# Patient Record
Sex: MALE | Race: WHITE | NOT HISPANIC OR LATINO | Employment: FULL TIME | URBAN - METROPOLITAN AREA
[De-identification: names, ages, dates, MRNs, and addresses within clinical notes are randomized per-mention and may not be internally consistent; named-entity substitution may affect disease eponyms.]

---

## 2017-06-01 ENCOUNTER — HOSPITAL ENCOUNTER (EMERGENCY)
Facility: CLINIC | Age: 32
Discharge: HOME | End: 2017-06-02
Attending: EMERGENCY MEDICINE | Admitting: EMERGENCY MEDICINE
Payer: COMMERCIAL

## 2017-06-01 VITALS
WEIGHT: 182 LBS | RESPIRATION RATE: 16 BRPM | BODY MASS INDEX: 25.48 KG/M2 | DIASTOLIC BLOOD PRESSURE: 74 MMHG | TEMPERATURE: 98.4 F | SYSTOLIC BLOOD PRESSURE: 125 MMHG | HEIGHT: 71 IN | OXYGEN SATURATION: 99 % | HEART RATE: 89 BPM

## 2017-06-01 DIAGNOSIS — S16.1XXA CERVICAL STRAIN, INITIAL ENCOUNTER: ICD-10-CM
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
## 2017-06-01 DIAGNOSIS — S70.01XA CONTUSION OF RIGHT HIP, INITIAL ENCOUNTER: ICD-10-CM

## 2017-06-01 PROCEDURE — 99283 EMERGENCY DEPT VISIT LOW MDM: CPT

## 2017-06-01 PROCEDURE — 25000132 ZZH RX MED GY IP 250 OP 250 PS 637: Performed by: EMERGENCY MEDICINE

## 2017-06-01 RX ORDER — HYDROCODONE BITARTRATE AND ACETAMINOPHEN 5; 325 MG/1; MG/1
1 TABLET ORAL ONCE
Status: COMPLETED | OUTPATIENT
Start: 2017-06-01 | End: 2017-06-01

## 2017-06-01 RX ADMIN — HYDROCODONE BITARTRATE AND ACETAMINOPHEN 1 TABLET: 5; 325 TABLET ORAL at 22:39

## 2017-06-01 ASSESSMENT — ENCOUNTER SYMPTOMS
NECK PAIN: 1
BACK PAIN: 1

## 2017-06-01 NOTE — LETTER
[facility] EMERGENCY DEPARTMENT  [address] [place] [address]  [phone]    Emir Brian  [address]  [facility] [address]  [phone] (home) [phone] (work)    : 1985      To Whom it may concern:    Emir Brian was seen in our Emergency Department today, 2017.  I expect his condition to improve over the next 1 days.  He may return to work/school when improved.    Sincerely,        Shannon Giles

## 2017-06-02 ENCOUNTER — APPOINTMENT (OUTPATIENT)
Dept: GENERAL RADIOLOGY | Facility: CLINIC | Age: 32
End: 2017-06-02
Attending: EMERGENCY MEDICINE
Payer: COMMERCIAL

## 2017-06-02 PROCEDURE — 72040 X-RAY EXAM NECK SPINE 2-3 VW: CPT

## 2017-06-02 RX ORDER — HYDROCODONE BITARTRATE AND ACETAMINOPHEN 5; 325 MG/1; MG/1
1 TABLET ORAL EVERY 4 HOURS PRN
Qty: 8 TABLET | Refills: 0 | Status: SHIPPED | OUTPATIENT
Start: 2017-06-02 | End: 2023-12-02

## 2017-06-02 NOTE — ED NOTES
Discharge instructions gone over with pt, verbalized understanding. Discharged home with plan for follow up and prescription for pain medications. Denies questions at time of discharge.
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
## 2018-04-23 ENCOUNTER — HOSPITAL ENCOUNTER (EMERGENCY)
Facility: CLINIC | Age: 33
Discharge: HOME | End: 2018-04-23
Attending: EMERGENCY MEDICINE | Admitting: EMERGENCY MEDICINE

## 2018-04-23 VITALS — TEMPERATURE: 98.6 F | DIASTOLIC BLOOD PRESSURE: 89 MMHG | OXYGEN SATURATION: 98 % | SYSTOLIC BLOOD PRESSURE: 153 MMHG

## 2018-04-23 DIAGNOSIS — T22.20XA PARTIAL THICKNESS BURN OF LEFT UPPER EXTREMITY, UNSPECIFIED SITE OF UPPER EXTREMITY, INITIAL ENCOUNTER: ICD-10-CM

## 2018-04-23 PROCEDURE — 99283 EMERGENCY DEPT VISIT LOW MDM: CPT

## 2018-04-23 PROCEDURE — 90471 IMMUNIZATION ADMIN: CPT

## 2018-04-23 PROCEDURE — 25000128 H RX IP 250 OP 636: Performed by: EMERGENCY MEDICINE

## 2018-04-23 PROCEDURE — 25000132 ZZH RX MED GY IP 250 OP 250 PS 637: Performed by: EMERGENCY MEDICINE

## 2018-04-23 PROCEDURE — 90715 TDAP VACCINE 7 YRS/> IM: CPT | Performed by: EMERGENCY MEDICINE

## 2018-04-23 RX ORDER — GINSENG 100 MG
CAPSULE ORAL 2 TIMES DAILY
Qty: 30 G | Refills: 0 | Status: SHIPPED | OUTPATIENT
Start: 2018-04-23 | End: 2023-12-02

## 2018-04-23 RX ORDER — GABAPENTIN 300 MG/1
300 CAPSULE ORAL 3 TIMES DAILY
Qty: 21 CAPSULE | Refills: 0 | Status: SHIPPED | OUTPATIENT
Start: 2018-04-23 | End: 2020-02-19

## 2018-04-23 RX ORDER — IBUPROFEN 800 MG/1
800 TABLET, FILM COATED ORAL EVERY 8 HOURS PRN
Qty: 21 TABLET | Refills: 0 | Status: SHIPPED | OUTPATIENT
Start: 2018-04-23 | End: 2018-04-30

## 2018-04-23 RX ORDER — GABAPENTIN 300 MG/1
300 CAPSULE ORAL ONCE
Status: COMPLETED | OUTPATIENT
Start: 2018-04-23 | End: 2018-04-23

## 2018-04-23 RX ORDER — IBUPROFEN 400 MG/1
800 TABLET, FILM COATED ORAL ONCE
Status: COMPLETED | OUTPATIENT
Start: 2018-04-23 | End: 2018-04-23

## 2018-04-23 RX ADMIN — GABAPENTIN 300 MG: 300 CAPSULE ORAL at 15:38

## 2018-04-23 RX ADMIN — IBUPROFEN 800 MG: 400 TABLET ORAL at 15:01

## 2018-04-23 RX ADMIN — CLOSTRIDIUM TETANI TOXOID ANTIGEN (FORMALDEHYDE INACTIVATED), CORYNEBACTERIUM DIPHTHERIAE TOXOID ANTIGEN (FORMALDEHYDE INACTIVATED), BORDETELLA PERTUSSIS TOXOID ANTIGEN (GLUTARALDEHYDE INACTIVATED), BORDETELLA PERTUSSIS FILAMENTOUS HEMAGGLUTININ ANTIGEN (FORMALDEHYDE INACTIVATED), BORDETELLA PERTUSSIS PERTACTIN ANTIGEN, AND BORDETELLA PERTUSSIS FIMBRIAE 2/3 ANTIGEN 0.5 ML: 5; 2; 2.5; 5; 3; 5 INJECTION, SUSPENSION INTRAMUSCULAR at 15:38

## 2018-04-23 NOTE — LETTER
April 23, 2018      To Whom It May Concern:      Emir Brian was seen in our Emergency Department today, 04/23/18.  I expect his condition to improve over the next 7 days.  He may return to work when improved.        Sincerely,        Richie Kang MD

## 2018-04-23 NOTE — DISCHARGE INSTRUCTIONS
Second-Degree Burn  A burn occurs when skin is exposed to too much heat, sun, or harsh chemicals. A second-degree burn (partial-thickness burn) is deeper than a first-degree burn (superficial burn). It usually causes a blister to form. The blister may remain intact and gradually go away on its own. Or it may break open. The goal of treatment is to relieve pain and stop infection while the burn heals.  Home care  Use pain medicine as directed. If no pain medicine was prescribed, you may use over-the-counter medicine to control pain. If you have chronic liver or kidney disease, talk with your healthcare provider before using acetaminophen or ibuprofen. Also talk with your provider if you've had a stomach ulcer or GI bleeding.  General care    On the first day, you may put a cool compress on the wound to ease pain. A cool compress is a small towel soaked in cool water.    If you were sent home with the blister intact, don't break the blister. The risk for infection is greater if the blister breaks. If a bandage was applied, change it once a day, unless told otherwise. If the bandage becomes wet or soiled, change it as soon as you can.    Sometimes an infection may occur even with proper treatment. Check the burn daily for the signs of infection listed below.    Eat more calories and protein until your wound is healed.    Wear a hat, sunscreen, and long sleeves while in the sun to protect the skin.    Don't pick or scratch at the wound. Use over-the-counter medicines like diphenhydramine for itching.    Avoid tight-fitting clothes.  To change a bandage:    Wash your hands.    Take off the old bandage. If the bandage sticks, soak it off under warm running water.    Once the bandage is off, gently wash the burn area with mild soap and warm water to remove any cream, ointment, ooze, or scab. You may do this in a sink, under a tub faucet, or in the shower. Rinse off the soap and gently pat dry with a clean towel.    Check  for signs of infection listed below.    Put any prescribed antibiotic cream or ointment on the wound.    Cover the burn with nonstick gauze. Then wrap it with the bandage material.  Follow-up care  Follow up with your healthcare provider, or as advised.  When to seek medical advice  Call your healthcare provider right away if you have any of these signs of infection:    Fever of 100.4 F (38 C) or higher, or as directed by your healthcare provider    Pain that gets worse    Redness or swelling that gets worse    Pus comes from the burn    Red streaks in your skin coming from the burn    Wound doesn't appear to be healing    Nausea or vomiting   Date Last Reviewed: 1/1/2017 2000-2017 [employer]. [address] [place] [address], [place] [address]. All rights reserved. This information is not intended as a substitute for professional medical care. Always follow your healthcare professional's instructions.

## 2018-04-23 NOTE — ED AVS SNAPSHOT
Emergency Department    [address] [place] [address]    Phone:  [phone]    Fax:  [phone]                                       Emir Brian   MRN: [id number]    Department:   Emergency Department   Date of Visit:  4/23/2018           After Visit Summary Signature Page     I have received my discharge instructions, and my questions have been answered. I have discussed any challenges I see with this plan with the nurse or doctor.    ..........................................................................................................................................  Patient/Patient Representative Signature      ..........................................................................................................................................  Patient Representative Print Name and Relationship to Patient    ..................................................               ................................................  Date                                            Time    ..........................................................................................................................................  Reviewed by Signature/Title    ...................................................              ..............................................  Date                                                            Time
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
## 2019-08-25 ENCOUNTER — HOSPITAL ENCOUNTER (EMERGENCY)
Facility: CLINIC | Age: 34
Discharge: HOME | End: 2019-08-26
Attending: EMERGENCY MEDICINE | Admitting: EMERGENCY MEDICINE
Payer: OTHER MISCELLANEOUS

## 2019-08-25 VITALS
RESPIRATION RATE: 18 BRPM | OXYGEN SATURATION: 99 % | DIASTOLIC BLOOD PRESSURE: 82 MMHG | SYSTOLIC BLOOD PRESSURE: 130 MMHG | HEART RATE: 82 BPM | TEMPERATURE: 98.7 F

## 2019-08-25 DIAGNOSIS — M50.20 CERVICAL DISC HERNIATION: ICD-10-CM

## 2019-08-25 PROCEDURE — 99285 EMERGENCY DEPT VISIT HI MDM: CPT | Mod: 25

## 2019-08-25 PROCEDURE — 96376 TX/PRO/DX INJ SAME DRUG ADON: CPT

## 2019-08-25 PROCEDURE — 25000128 H RX IP 250 OP 636: Performed by: EMERGENCY MEDICINE

## 2019-08-25 PROCEDURE — 96374 THER/PROPH/DIAG INJ IV PUSH: CPT

## 2019-08-25 RX ORDER — OXYCODONE AND ACETAMINOPHEN 5; 325 MG/1; MG/1
1-2 TABLET ORAL
COMMUNITY
Start: 2015-02-19 | End: 2020-02-19

## 2019-08-25 RX ORDER — LORAZEPAM 2 MG/ML
0.5 INJECTION INTRAMUSCULAR ONCE
Status: COMPLETED | OUTPATIENT
Start: 2019-08-25 | End: 2019-08-25

## 2019-08-25 RX ORDER — CYCLOBENZAPRINE HCL 10 MG
10 TABLET ORAL 3 TIMES DAILY PRN
Qty: 20 TABLET | Refills: 0 | Status: SHIPPED | OUTPATIENT
Start: 2019-08-25 | End: 2020-02-19

## 2019-08-25 RX ORDER — TRAMADOL HYDROCHLORIDE 50 MG/1
50 TABLET ORAL EVERY 6 HOURS PRN
COMMUNITY
End: 2020-02-19

## 2019-08-25 RX ADMIN — LORAZEPAM 0.5 MG: 2 INJECTION INTRAMUSCULAR; INTRAVENOUS at 22:32

## 2019-08-25 RX ADMIN — LORAZEPAM 0.5 MG: 2 INJECTION INTRAMUSCULAR; INTRAVENOUS at 23:22

## 2019-08-25 ASSESSMENT — ENCOUNTER SYMPTOMS
MYALGIAS: 1
NUMBNESS: 1
WEAKNESS: 1

## 2019-08-25 NOTE — ED AVS SNAPSHOT
[facility] Emergency Department  [address]  [facility] [address]  Phone:  [phone]  Fax:  [phone]                                    Emir Brian   MRN: [id number]    Department:  [facility] Emergency Department   Date of Visit:  8/25/2019           After Visit Summary Signature Page    I have received my discharge instructions, and my questions have been answered. I have discussed any challenges I see with this plan with the nurse or doctor.    ..........................................................................................................................................  Patient/Patient Representative Signature      ..........................................................................................................................................  Patient Representative Print Name and Relationship to Patient    ..................................................               ................................................  Date                                   Time    ..........................................................................................................................................  Reviewed by Signature/Title    ...................................................              ..............................................  Date                                               Time          22EPIC Rev 08/18

## 2019-08-26 NOTE — ED PROVIDER NOTES
"  History     Chief Complaint:  Arm pain    HPI   Emir Brian is a 34 year old male who presents with arm pain. The patient injured his right trapezius while at work as a . He describes hearing a pop in his right shoulder while throwing items into his truck and progressive pain thereafter. He was seen by an occupational health clinic in [place] on Friday after some twitching and spasming in the right arm. MRI was taken of the cervical spine and right shoulder at [facility] on 8/19, findings below. He was placed on a Medrol Dosepak and given Vicodin for the pain. He presents to the ED today after sudden onset of \"extreme nerve pain\" and numbness thirty minutes ago in the right arm from the shoulder down into the right ring and pinky fingers after straightening his right arm while in bed. He describes intermittent \"zinging\" down the arm and weakness in the hand. He took Vicodin thirty minutes ago. He is in the process of getting spine follow up.     MR Right Shoulder: 8/19/2019 8:30 AM    1. Mild AC joint degenerative changes.   2. No rotator cuff tear or evidence of a labral injury.     MR Cervical Spine: 8/19/2019 8:00 AM    1. No high-grade central canal narrowing in the cervical region. No intrinsic cord abnormality.  2. At the C6-C7 level there is a shallow broad-based right paracentral disc and osteophyte complex with a small posterior annular tear on the right. This results in mild to moderate right neural foraminal stenosis.   3. At the C5-C6 level there is a shallow central disc protrusion superimposed on mild dorsal spurring and bilateral uncinate spurring right greater than left. The central canal is adequate. There is moderately severe right C5-C6 foraminal narrowing and moderate left C5-C6 foraminal narrowing.  4. At the C3-C4 level there is mild to moderate right neural foraminal stenosis.      Allergies:  Penicillins      Medications:    Adderall   Vicodin    Past Medical " History:    ADD    Past Surgical History:    History reviewed. No pertinent surgical history.     Family History:    CAD  IBS  Ephysema  Hyperlipidemia  Hypertension  Stroke  PVD  MI  Macular degeneration  COPD   Lung cancer     Social History:  Smoking status: Former  Patient presents with girlfriend.  PCP: Perla Monson    Marital Status:  Single      Review of Systems   Musculoskeletal: Positive for myalgias.   Neurological: Positive for weakness and numbness.   All other systems reviewed and are negative.      Physical Exam     Patient Vitals for the past 24 hrs:   BP Temp Temp src Pulse Resp SpO2   08/25/19 2354 130/82 -- -- 82 18 99 %   08/25/19 2213 (!) 142/85 98.7  F (37.1  C) Oral 102 (!) 36 100 %      Physical Exam  General: Patient is alert and interactive when I enter the room, crying, uncomfortable appearing   Head:  The scalp, face, and head appear normal  Eyes:  Conjunctivae are normal  ENT:    The nose is normal    Pinnae are normal    External acoustic canals are normal  Neck:  Trachea midline  CV:  Pulses are normal.    Resp:  No respiratory distress   Abdomen:      Soft, non-tender, non-distended  Musc:  Normal muscular tone    No major joint effusions    No asymmetric leg swelling    Holding hand and full grasp so difficult to fully assess strength however 5 out of 5 triceps and biceps strength on the right, out of 5 hand grasp on the right, 5 out of 5 wrist flexion and extension, difficulty with thumb extension and flexion, some difficulty with 2+ radial pulse, reports pain and possible decreased sensation along the C8 nerve root  Skin:  No rash or lesions noted  Neuro:  Speech is normal and fluent. Face is symmetric.     Moving all extremities well.   Psych: Awake. Alert.  Normal affect.  Appropriate interactions.      Emergency Department Course     Interventions:  2232 - Ativan 0.5 mg IV  2322 - Ativan 0.5 mg IV    The patient's symptoms were partially improved with parenteral  benzodiazapines.    Emergency Department Course:  Past medical records, nursing notes, and vitals reviewed.  2210: I performed an exam of the patient and obtained history, as documented above.     2337: I rechecked the patient. Findings and plan explained to the Patient. Patient discharged home with instructions regarding supportive care, medications, and reasons to return. The importance of close follow-up was reviewed.       Impression & Plan      Medical Decision Making:  Emir Brian is a 34-year-old male who presents with neck pain and arm pain.  Patient had a injury at work and is been seeing Workmen's Comp. physician and had an MRI done of his shoulder and his neck.  This showed a herniated disc at C6-C7.  His pain and possible weakness seem to be in the C8 nerve root.  He may be has some pain along the C7 distribution but it seems more reliably on the C8.  Given his significant degree of pain despite his pain medications at home I discussed whether or not he would benefit from admission.  I told him that if he indeed is having symptoms of the correlate to this herniation he may need surgery.  He is Javier on Medrol Dosepak and pain medication.  I gave him a milligram of Ativan to see if this help with his muscle spasm and it seemed to greatly improve.  I discussed with him about admission versus going home.  He would prefer to go home as he has an appointment with the doctor tomorrow morning and hopefully will see a spine specialist after this.  I discussed with him very careful return precautions and he was agreeable to this.  Patient discharged with muscle relaxants as well as his pain medication that he has at home.  Patient discharged.    Diagnosis:    ICD-10-CM    1. Cervical disc herniation M50.20      Disposition:  Discharged.     Discharge Medications:   Details   cyclobenzaprine (FLEXERIL) 10 MG tablet Take 1 tablet (10 mg) by mouth 3 times daily as needed for muscle spasms, Disp-20 tablet, R-0, Local  Print     Juliano Chao  8/25/2019   [facility] EMERGENCY DEPARTMENT    Scribe Disclosure:  I, Juliano Chao, am serving as a scribe at 10:10 PM on 8/25/2019 to document services personally performed by Delfina Zurita MD based on my observations and the provider's statements to me.        Delfina Zurita MD  08/27/19 0124
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
## 2020-01-23 ENCOUNTER — TRANSFERRED RECORDS (OUTPATIENT)
Dept: HEALTH INFORMATION MANAGEMENT | Facility: CLINIC | Age: 35
End: 2020-01-23

## 2020-01-24 ENCOUNTER — TRANSCRIBE ORDERS (OUTPATIENT)
Dept: OTHER | Age: 35
End: 2020-01-24

## 2020-01-24 DIAGNOSIS — R25.1 TREMOR: Primary | ICD-10-CM

## 2020-01-28 NOTE — TELEPHONE ENCOUNTER
RECORDS RECEIVED FROM: External   Date of Appt: 2/5/20   NOTES (FOR ALL VISITS) STATUS DETAILS   OFFICE NOTE from referring provider Received Dr Dee Dee Kearns @ [facility]:  1/23/20   OFFICE NOTE from other specialist Received Dr Siddharth Millard @ [facility]:  1/6/20 12/16/19 11/25/19   DISCHARGE SUMMARY from hospital N/A    DISCHARGE REPORT from the ER N/A    OPERATIVE REPORT N/A    MEDICATION LIST Received    IMAGING  (FOR ALL VISITS)     EMG N/A    EEG N/A    MRI (HEAD, NECK, SPINE) Received [facility]:  MRI Brain 1/31/20  MRI Cervical Spine 8/19/19   LUMBAR PUNCTURE N/A    FARHANA Scan N/A    CT (HEAD, NECK, SPINE) N/A       Action 1/28/20 MV 3.52pm   Action Taken Records request faxed to [facility] and [facility]     Action 1/31/20 MV 1.08pm   Action Taken 2nd records request faxed to [facility] and [facility]     Phone Call:  2/4/20 MV 10.28am   Contact Name [facility]   Outcome Called and asked for records to be faxed over and images pushed. Per HIM, records will be faxed and MRI Brain will be pushed.     Phone Call:  2/4/20 MV 10.35am   Contact Name [facility]   Outcome Spoke with HIM and asked for records to be faxed. Per HIM, records will be faxed over.     Imaging Received  2/4/20 MV 10.38am  [facility]   Image Type (x): Disc:   PACS: x   Exam Date/Name MRI Brain 1/31/20  MRI Cervical Spine 8/19/19 Comments: images resolved in PACS     Action 2/4/20 MV 2.55pm   Action Taken Records received from [facility] via fax. Sent to scanning     Action 2/5/20 MV 7.45am   Action Taken 3rd request faxed to [facility]     Phone Call:  2/5/20 MV 10.10am   Contact Name [facility]    Outcome Spoke with [facility] - records will be faxed     Action 2/5/20 MV 10.17am   Action Taken Received call from [facility] - [facility] needs a CHRIS from the patient. [facility] will have everything ready and will fax over records as soon as they  receive the CHRIS. Mar's direct dial is [phone]. Will call Mar once CHRIS is received from patient     Action 2/5/20 MV 3.04pm   Action Taken Records received from [facility] via fax. Handed notes to Dr. Huertas

## 2020-01-31 ENCOUNTER — TRANSFERRED RECORDS (OUTPATIENT)
Dept: HEALTH INFORMATION MANAGEMENT | Facility: CLINIC | Age: 35
End: 2020-01-31

## 2020-02-05 ENCOUNTER — OFFICE VISIT (OUTPATIENT)
Dept: NEUROLOGY | Facility: CLINIC | Age: 35
End: 2020-02-05
Attending: PHYSICAL MEDICINE & REHABILITATION
Payer: COMMERCIAL

## 2020-02-05 ENCOUNTER — PRE VISIT (OUTPATIENT)
Dept: NEUROLOGY | Facility: CLINIC | Age: 35
End: 2020-02-05

## 2020-02-05 VITALS
WEIGHT: 183 LBS | OXYGEN SATURATION: 96 % | HEART RATE: 106 BPM | DIASTOLIC BLOOD PRESSURE: 68 MMHG | SYSTOLIC BLOOD PRESSURE: 138 MMHG | BODY MASS INDEX: 25.52 KG/M2

## 2020-02-05 DIAGNOSIS — R25.1 TREMOR: Primary | ICD-10-CM

## 2020-02-05 DIAGNOSIS — G56.80: ICD-10-CM

## 2020-02-05 DIAGNOSIS — R29.898 WEAKNESS OF RIGHT HAND: ICD-10-CM

## 2020-02-05 RX ORDER — MULTIPLE VITAMINS W/ MINERALS TAB 9MG-400MCG
1 TAB ORAL DAILY
COMMUNITY

## 2020-02-05 RX ORDER — GABAPENTIN 600 MG/1
600 TABLET ORAL
COMMUNITY
End: 2023-12-02

## 2020-02-05 RX ORDER — GABAPENTIN 600 MG/1
600 TABLET ORAL 2 TIMES DAILY
Status: CANCELLED | COMMUNITY
Start: 2020-02-05

## 2020-02-05 RX ORDER — IBUPROFEN 800 MG/1
400 TABLET, FILM COATED ORAL EVERY 8 HOURS PRN
COMMUNITY

## 2020-02-05 ASSESSMENT — PAIN SCALES - GENERAL: PAINLEVEL: SEVERE PAIN (6)

## 2020-02-05 NOTE — NURSING NOTE
Chief Complaint   Patient presents with     Consult     [facility] NEW MOVEMENT DISORDER - TREMOR      Enedelia Nolen, EMT
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
## 2020-02-05 NOTE — PATIENT INSTRUCTIONS
We recommend either Cymbalta( Duloxetine ) or Lyrica ( Pregabalin) for pain control.    EMG has been ordered.    We recommend minimizing narcotic use .
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
## 2020-02-17 ENCOUNTER — TELEPHONE (OUTPATIENT)
Dept: NEUROLOGY | Facility: CLINIC | Age: 35
End: 2020-02-17

## 2020-02-17 NOTE — TELEPHONE ENCOUNTER
[facility] Call Center    Phone Message    May a detailed message be left on voicemail: yes     Reason for Call: Other: There wasn't anyone at the desk when the pt checked out so he did not get a check out summary. Please print and mail a summary to his home. Thanks.     Action Taken: Message routed to:  [facility]    Travel Screening: Not Applicable

## 2020-03-19 ENCOUNTER — OFFICE VISIT (OUTPATIENT)
Dept: NEUROLOGY | Facility: CLINIC | Age: 35
End: 2020-03-19
Attending: PSYCHIATRY & NEUROLOGY
Payer: COMMERCIAL

## 2020-03-19 DIAGNOSIS — G54.0 BRACHIAL PLEXOPATHY: Primary | ICD-10-CM

## 2020-03-19 DIAGNOSIS — R25.1 TREMOR: ICD-10-CM

## 2020-03-19 NOTE — PROGRESS NOTES
[facility]  Electrodiagnostic Laboratory    Nerve Conduction & EMG Report          Patient:       Emir Brian  Patient ID:    [id number]  Gender:        Male  YOB: 1985  Age:           35 Years 2 Months        History & Examination:This is a very pleasant 35 year old gentleman referred by Dr. Camille Huertas.  He had an injury to his right shoulder and arm in August of 2019 and has persistent right shoulder pain, right arm and hand weakness/numbenss and right arm tremor.      Techniques: Motor conduction studies were done with surface recording electrodes. Sensory conduction studies were performed with surface electrodes, unless indicated otherwise by (n), designating the use of subdermal recording electrodes. Temperature was monitored and recorded throughout the study. Upper extremities were maintained at a temperature of 32 degrees Centigrade or higher.  Lower extremities were maintained at a temperature of 31degrees Centigrade or higher. EMG was done with a concentric needle electrode.        Results:The right median and ulnar antidromic sensory nerve action potentials are normal.  The right superficial radial sensory nerve action potential is normal.  Bilateral medial and lateral cutaneous nerve of the forearm sensory nerve action potentials are normal.  Median and ulnar distal sensory latencies were compared with the palmar sensory technique.  There was borderline (0.36 ms) disproportionate slowing of right median compared to right ulnar distal sensory conduction.  Right median motor and right ulnar motor conduction studies were normal.  Distal median and ulnar motor conduction was compared by stimulating the median nerve recording from the lumbricals and stimulating the ulnar nerve and recording from the second palmar interosseous.  There was no  disproportionate slowing of the right median motor distal latency.  Needle exam of the right arm was essentially normal.  There was  reduced activation of the right first dorsal interosseous, right extensor indices proprius and right pronator teres.  No evidence of lower motor neuron involvement was found in the right arm.    A tremor study was performed using surface electrodes applied to multiple muscles of the right and left arm.  The right first dorsal interosseous, right extensor digitorum commonness, right extensor carpi radialis, right extensor carpi all naris, right pronator teres, right flexor digitorum profundus, right biceps, right triceps and right deltoid muscles were tested.  On comparison muscle in the left first dorsal interosseous was also tested.  Tremor activity was seen in the right flexor carpi ulnaris and right biceps regions.  This tremor activity had a frequency of approximately 6 Hz, although the frequency seemed variable.  This tremor activity distracted completely with mental distraction and tapping in the left arm at a non-harmonic frequency.      Interpretation: The EMG is minimally abnormal.  There are mild changes that suggest an extremely mild right median neuropathy at the wrist (carpal tunnel syndrome).  There is no evidence of a right cervical radiculopathy or right brachial plexopathy.  The poor activation in several muscles can be caused by pain, poor effort or an upper motor neuron lesion.  Clinical correlation is necessary.    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        EMG Physician: Cuate Wheeler MD         Sensory NCS      Nerve / Sites Rec. Site Onset Peak Ref. NP Amp Ref. PP Amp Dist Agus Ref. Temp     ms ms ms  V  V  V cm m/s m/s  C   R MEDIAN - Dig II Anti      Wrist Dig II 2.29 3.44  29.6 10.0 43.4 14 61.1 48.0 32.6   R ULNAR - Dig V Anti      Wrist Dig V 2.03 3.02  30.1 8.0 38.4 12.5 61.5 48.0 32.5   R RADIAL - Snuff      Forearm Snuff 1.88 2.40  27.8 15.0 40.4 10 53.3 48.0 32.4   R MUSCULOCUTANEOUS      Elbow Forearm 1.77 2.60 3.30 11.8   54.5 10 56.5  32.5   L MUSCULOCUTANEOUS      Elbow Forearm 1.56 2.03 3.30 17.1  34.7 10 64.0  32.7   R MED CUT N FORE      Elbow Forearm 2.55 3.18 3.30 4.3  6.9 16 62.7  32.7   L MED CUT N FORE      Elbow Forearm 2.66 3.13 3.30 4.4  5.6 16 60.2  32.5   R MEDIAN - Ulnar - Palmar      Median Wrist 1.67 2.03 2.40 63.9  69.8 8 48.0  32.6      Ulnar Wrist 1.20 1.67 2.40 61.8  76.6 8 66.8  32.6       Motor NCS      Nerve / Sites Rec. Site Lat Ref. Amp Ref. Rel Amp Dist Agus Ref. Dur. Area Temp.     ms ms mV mV % cm m/s m/s ms %  C   R MEDIAN - APB      Wrist APB 3.13 4.40 15.2 5.0 100 8   7.24 100 32.5      Elbow APB 7.19  15.1  99.2 24.5 60.3 48.0 7.34 93.7 32.5   R ULNAR - ADM      Wrist ADM 2.92 3.50 15.6 5.0 100 8   7.08 100 32.5      B.Elbow ADM 6.30  14.1  90.2 21.5 63.5 48.0 7.40 99.9 32.5      A.Elbow ADM 7.86  14.4  92 10 64.0 48.0 7.29 97.9 32.4   R MEDIAN - II Lumb      Median II Lumb 3.13  2.2  100 10   6.98 100 32.7      Ulnar Palm Int 3.18  5.9  269 10   4.74 149 32.6       Needle EMG (W)      EMG Summary Table     Spontaneous MUAP Recruitment    IA Fib PSW Fasc H.F. Amp Dur. PPP Pattern   R. FIRST D INTEROSS N None None None None N N N N   R. EXT INDICIS N None None None None N N N N   R. FLEX POLL LONG N None None None None N N N N   R. OPP POLL N None None None None N N N N   R. PRON TERES N None None None None N N N N   R. BRACHIORADIALIS N None None None None N N N N   R. BICEPS N None None None None N N N N   R. TRICEPS N None None None None N N N N   R. DELTOID N None None None None N N N N

## 2020-03-19 NOTE — PROGRESS NOTES
He  was evaluated with  MRI cervical spine and shoulder.  We reviewed the available images.  MRI cervical spine is with no intrinsic cord abnormality.  At C5-C6 there is a central disc protrusion but the central canal is normal.  MRI brachial plexus was reported normal.  Also per patient he underwent 2 EMG studies.  The first 1 was done in September and the last one in November -December and both were normal.  He underwent epidural steroid injection for neck pain, it did alleviate the pain in his neck but with continued pain in his biceps region and along with tremor and weakness in his right hand.  He can barely hold a pen with his right thumb.    We reviewed the records by Dr. Kearns from [facility] where he was seen and evaluated for right neck and right upper extremity pain with diffuse right upper extremity weakness and MRI evidence of small disc protrusion.  Negative right MRI brachial plexus and EMG showing mild right C6 radiculitis.  An MRI brain has been ordered and we reviewed images and MRI is unremarkable.  As per records MRI of the brachial plexus dated 8/29/2029 performed at [facility] with right brachial plexus appears normal.  Incidental degenerative cervical spondylosis with disc protrusion at C5-C6 and C6-C7.  EMG report dated 9/9/ 2019 performed by .  Very mild chronic injury or irritation  of the right C6 nerve root.  And there is no electrical evidence for a right C8 nerve root injury, ulnar neuropathy or right brachial plexopathy.  MRI of the cervical spine dated 8/1919 performed at [facility]  Small central disc protrusion with moderate to severe right foraminal stenosis.    rength: Right deltoid, right biceps right triceps with good effort 5/5.  There is weakness 4/5 with thumb flexion and extension, with index finger flexion and fingers extension.  There is no weakness in left upper extremity .  Sensation: decreased sensation in the C5-C6 with right thumb involvement.

## 2020-03-19 NOTE — LETTER
3/19/2020       RE: Emir Brian  [address] [address]     Dear Colleague,    Thank you for referring your patient, Emir Brian, to the [facility] EMG at [facility]. Please see a copy of my visit note below.      He  was evaluated with  MRI cervical spine and shoulder.  We reviewed the available images.  MRI cervical spine is with no intrinsic cord abnormality.  At C5-C6 there is a central disc protrusion but the central canal is normal.  MRI brachial plexus was reported normal.  Also per patient he underwent 2 EMG studies.  The first 1 was done in September and the last one in November -December and both were normal.  He underwent epidural steroid injection for neck pain, it did alleviate the pain in his neck but with continued pain in his biceps region and along with tremor and weakness in his right hand.  He can barely hold a pen with his right thumb.    We reviewed the records by Dr. Kearns from [facility] where he was seen and evaluated for right neck and right upper extremity pain with diffuse right upper extremity weakness and MRI evidence of small disc protrusion.  Negative right MRI brachial plexus and EMG showing mild right C6 radiculitis.  An MRI brain has been ordered and we reviewed images and MRI is unremarkable.  As per records MRI of the brachial plexus dated 8/29/2029 performed at [facility] with right brachial plexus appears normal.  Incidental degenerative cervical spondylosis with disc protrusion at C5-C6 and C6-C7.  EMG report dated 9/9/ 2019 performed by .  Very mild chronic injury or irritation  of the right C6 nerve root.  And there is no electrical evidence for a right C8 nerve root injury, ulnar neuropathy or right brachial plexopathy.  MRI of the cervical spine dated 8/1919 performed at [facility]  Small central disc protrusion with moderate to severe right foraminal stenosis.    rength: Right deltoid, right biceps right triceps  with good effort 5/5.  There is weakness 4/5 with thumb flexion and extension, with index finger flexion and fingers extension.  There is no weakness in left upper extremity .  Sensation: decreased sensation in the C5-C6 with right thumb involvement.                  [facility]  Electrodiagnostic Laboratory    Nerve Conduction & EMG Report          Patient:       Emir Brian  Patient ID:    [id number]  Gender:        Male  YOB: 1985  Age:           35 Years 2 Months        History & Examination:This is a very pleasant 35 year old gentleman referred by Dr. Camille Huertas.  He had an injury to his right shoulder and arm in August of 2019 and has persistent right shoulder pain, right arm and hand weakness/numbenss and right arm tremor.      Techniques: Motor conduction studies were done with surface recording electrodes. Sensory conduction studies were performed with surface electrodes, unless indicated otherwise by (n), designating the use of subdermal recording electrodes. Temperature was monitored and recorded throughout the study. Upper extremities were maintained at a temperature of 32 degrees Centigrade or higher.  Lower extremities were maintained at a temperature of 31degrees Centigrade or higher. EMG was done with a concentric needle electrode.        Results:The right median and ulnar antidromic sensory nerve action potentials are normal.  The right superficial radial sensory nerve action potential is normal.  Bilateral medial and lateral cutaneous nerve of the forearm sensory nerve action potentials are normal.  Median and ulnar distal sensory latencies were compared with the palmar sensory technique.  There was borderline (0.36 ms) disproportionate slowing of right median compared to right ulnar distal sensory conduction.  Right median motor and right ulnar motor conduction studies were normal.  Distal median and ulnar motor conduction was compared by stimulating the median nerve  recording from the lumbricals and stimulating the ulnar nerve and recording from the second palmar interosseous.  There was no  disproportionate slowing of the right median motor distal latency.  Needle exam of the right arm was essentially normal.  There was reduced activation of the right first dorsal interosseous, right extensor indices proprius and right pronator teres.  No evidence of lower motor neuron involvement was found in the right arm.    A tremor study was performed using surface electrodes applied to multiple muscles of the right and left arm.  The right first dorsal interosseous, right extensor digitorum commonness, right extensor carpi radialis, right extensor carpi all naris, right pronator teres, right flexor digitorum profundus, right biceps, right triceps and right deltoid muscles were tested.  On comparison muscle in the left first dorsal interosseous was also tested.  Tremor activity was seen in the right flexor carpi ulnaris and right biceps regions.  This tremor activity had a frequency of approximately 6 Hz, although the frequency seemed variable.  This tremor activity distracted completely with mental distraction and tapping in the left arm at a non-harmonic frequency.      Interpretation: The EMG is minimally abnormal.  There are mild changes that suggest an extremely mild right median neuropathy at the wrist (carpal tunnel syndrome).  There is no evidence of a right cervical radiculopathy or right brachial plexopathy.  The poor activation in several muscles can be caused by pain, poor effort or an upper motor neuron lesion.  Clinical correlation is necessary.    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        EMG Physician: Cuate Wheeler MD         Sensory NCS      Nerve / Sites Rec. Site Onset Peak Ref. NP Amp Ref. PP Amp Dist Agus Ref. Temp     ms ms ms  V  V  V cm m/s m/s  C   R MEDIAN - Dig II Anti      Wrist Dig II 2.29 3.44   29.6 10.0 43.4 14 61.1 48.0 32.6   R ULNAR - Dig V Anti      Wrist Dig V 2.03 3.02  30.1 8.0 38.4 12.5 61.5 48.0 32.5   R RADIAL - Snuff      Forearm Snuff 1.88 2.40  27.8 15.0 40.4 10 53.3 48.0 32.4   R MUSCULOCUTANEOUS      Elbow Forearm 1.77 2.60 3.30 11.8  54.5 10 56.5  32.5   L MUSCULOCUTANEOUS      Elbow Forearm 1.56 2.03 3.30 17.1  34.7 10 64.0  32.7   R MED CUT N FORE      Elbow Forearm 2.55 3.18 3.30 4.3  6.9 16 62.7  32.7   L MED CUT N FORE      Elbow Forearm 2.66 3.13 3.30 4.4  5.6 16 60.2  32.5   R MEDIAN - Ulnar - Palmar      Median Wrist 1.67 2.03 2.40 63.9  69.8 8 48.0  32.6      Ulnar Wrist 1.20 1.67 2.40 61.8  76.6 8 66.8  32.6       Motor NCS      Nerve / Sites Rec. Site Lat Ref. Amp Ref. Rel Amp Dist Agus Ref. Dur. Area Temp.     ms ms mV mV % cm m/s m/s ms %  C   R MEDIAN - APB      Wrist APB 3.13 4.40 15.2 5.0 100 8   7.24 100 32.5      Elbow APB 7.19  15.1  99.2 24.5 60.3 48.0 7.34 93.7 32.5   R ULNAR - ADM      Wrist ADM 2.92 3.50 15.6 5.0 100 8   7.08 100 32.5      B.Elbow ADM 6.30  14.1  90.2 21.5 63.5 48.0 7.40 99.9 32.5      A.Elbow ADM 7.86  14.4  92 10 64.0 48.0 7.29 97.9 32.4   R MEDIAN - II Lumb      Median II Lumb 3.13  2.2  100 10   6.98 100 32.7      Ulnar Palm Int 3.18  5.9  269 10   4.74 149 32.6       Needle EMG (W)      EMG Summary Table     Spontaneous MUAP Recruitment    IA Fib PSW Fasc H.F. Amp Dur. PPP Pattern   R. FIRST D INTEROSS N None None None None N N N N   R. EXT INDICIS N None None None None N N N N   R. FLEX POLL LONG N None None None None N N N N   R. OPP POLL N None None None None N N N N   R. PRON TERES N None None None None N N N N   R. BRACHIORADIALIS N None None None None N N N N   R. BICEPS N None None None None N N N N   R. TRICEPS N None None None None N N N N   R. DELTOID N None None None None N N N N                                  Again, thank you for allowing me to participate in the care of your patient.      Sincerely,    Cuate Wheeler MD

## 2023-12-02 ENCOUNTER — HOSPITAL ENCOUNTER (EMERGENCY)
Facility: CLINIC | Age: 38
Discharge: HOME | End: 2023-12-02
Attending: EMERGENCY MEDICINE | Admitting: EMERGENCY MEDICINE
Payer: COMMERCIAL

## 2023-12-02 VITALS
SYSTOLIC BLOOD PRESSURE: 136 MMHG | DIASTOLIC BLOOD PRESSURE: 89 MMHG | BODY MASS INDEX: 25.81 KG/M2 | RESPIRATION RATE: 16 BRPM | TEMPERATURE: 98.7 F | HEART RATE: 119 BPM | HEIGHT: 71 IN | WEIGHT: 184.4 LBS | OXYGEN SATURATION: 97 %

## 2023-12-02 DIAGNOSIS — F32.A DEPRESSIVE DISORDER: ICD-10-CM

## 2023-12-02 DIAGNOSIS — F90.0 ATTENTION DEFICIT HYPERACTIVITY DISORDER (ADHD), PREDOMINANTLY INATTENTIVE TYPE: ICD-10-CM

## 2023-12-02 DIAGNOSIS — F10.90 ALCOHOL USE DISORDER: ICD-10-CM

## 2023-12-02 DIAGNOSIS — F41.1 GAD (GENERALIZED ANXIETY DISORDER): ICD-10-CM

## 2023-12-02 PROBLEM — F41.9 ANXIETY: Status: ACTIVE | Noted: 2023-12-02

## 2023-12-02 PROCEDURE — 99283 EMERGENCY DEPT VISIT LOW MDM: CPT

## 2023-12-02 PROCEDURE — 99284 EMERGENCY DEPT VISIT MOD MDM: CPT | Performed by: NURSE PRACTITIONER

## 2023-12-02 RX ORDER — MIRTAZAPINE 15 MG/1
15 TABLET, FILM COATED ORAL AT BEDTIME
Qty: 30 TABLET | Refills: 0 | Status: SHIPPED | OUTPATIENT
Start: 2023-12-02

## 2023-12-02 RX ORDER — ALBUTEROL SULFATE 90 UG/1
2 AEROSOL, METERED RESPIRATORY (INHALATION) EVERY 6 HOURS PRN
COMMUNITY
Start: 2023-05-15

## 2023-12-02 RX ORDER — DEXTROAMPHETAMINE SACCHARATE, AMPHETAMINE ASPARTATE, DEXTROAMPHETAMINE SULFATE AND AMPHETAMINE SULFATE 2.5; 2.5; 2.5; 2.5 MG/1; MG/1; MG/1; MG/1
10 TABLET ORAL DAILY PRN
COMMUNITY
Start: 2022-12-30

## 2023-12-02 RX ORDER — LEVOCETIRIZINE DIHYDROCHLORIDE 5 MG/1
5 TABLET, FILM COATED ORAL DAILY
COMMUNITY

## 2023-12-02 RX ORDER — DEXTROAMPHETAMINE SACCHARATE, AMPHETAMINE ASPARTATE, DEXTROAMPHETAMINE SULFATE AND AMPHETAMINE SULFATE 7.5; 7.5; 7.5; 7.5 MG/1; MG/1; MG/1; MG/1
30 TABLET ORAL DAILY
COMMUNITY
Start: 2022-12-29

## 2023-12-02 ASSESSMENT — ACTIVITIES OF DAILY LIVING (ADL)
ADLS_ACUITY_SCORE: 35
ADLS_ACUITY_SCORE: 35

## 2023-12-02 NOTE — ED NOTES
[facility]  ED to [facility] Checklist:      Goal for [facility]: Depression management    Current Behavior: Calm    Safety Concerns: None    Legal Hold Status: Voluntary    Medically Cleared by ED provider: Yes    Patient Therapeutically Searched: Not searched - Currently in triage    Belongings: Remain with patient    Independent Ambulation at Baseline: Yes/No: Yes    Participates in Care/Conversation: Yes/No: Yes    Patient Informed about [facility]: Yes/No: Yes    DEC: Ordered and pending    Patient Ready to be Transferred to [facility]? Yes/No: Yes

## 2023-12-02 NOTE — ED TRIAGE NOTES
Patient presents today with complaints of mental health difficulties for which he would like to be seen. 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      Triage Assessment (Adult)       Row Name 12/02/23 1436          Triage Assessment    Airway WDL WDL        Respiratory WDL    Respiratory WDL WDL        Skin Circulation/Temperature WDL    Skin Circulation/Temperature WDL WDL        Cardiac WDL    Cardiac WDL WDL        Peripheral/Neurovascular WDL    Peripheral Neurovascular WDL WDL        Cognitive/Neuro/Behavioral WDL    Cognitive/Neuro/Behavioral WDL WDL

## 2023-12-02 NOTE — ED PROVIDER NOTES
"    History     Chief Complaint:  Mental Health Problem       HPI   Emir Brian is a 38 year old male who presents to the emergency department with family.  He is extremely distraught in a custody reyes for his 4-year-old daughter.  He had been sober for 11 months but has had occasional drink slightly with 3 last night.  He does not feel intoxicated or feeling withdrawal symptoms he is tearful and angry with what is going on.  He has never required hospitalization for mental health but is on medication for ADD he occasionally uses marijuana no other drugs he has no thoughts of harming himself or others or hallucinations.  Family none      Independent Historian:    Family    Review of External Notes:  None    Medications:    ALLEGRA-D 12 HOUR OR  Amphetamine-Dextroamphetamine (ADDERALL PO)  bacitracin 500 UNIT/GM OINT  gabapentin (NEURONTIN) 600 MG tablet  HYDROcodone-acetaminophen (NORCO) 5-325 MG per tablet  ibuprofen (ADVIL/MOTRIN) 800 MG tablet  multivitamin w/minerals (MULTI-VITAMIN) tablet        Past Medical History:    Past Medical History:   Diagnosis Date    ADD (attention deficit disorder)        Past Surgical History:    No past surgical history on file.       Physical Exam   Patient Vitals for the past 24 hrs:   BP Temp Temp src Pulse Resp SpO2 Height Weight   12/02/23 1438 138/85 98.1  F (36.7  C) Temporal 106 16 98 % 1.803 m (5' 11\") 81.6 kg (180 lb)        Physical Exam  Constitutional: Young white male sitting no respiratory distress  HENT: No signs of trauma.   Eyes: EOM are normal. Pupils are equal, round, and reactive to light.   Neck: Normal range of motion. No JVD present. No cervical adenopathy.  Cardiovascular: Regular rhythm.  Exam reveals no gallop and no friction rub.    No murmur heard.  Pulmonary/Chest: Bilateral breath sounds normal. No wheezes, rhonchi or rales.  Abdominal: Soft. No tenderness. No rebound or guarding.   Musculoskeletal: No edema. No tenderness.   Lymphadenopathy: No " lymphadenopathy.   Neurological: Alert and oriented to person, place, and time. Normal strength. Coordination normal.   Skin: Skin is warm and dry. No rash noted. No erythema.  Psych: Patient tearful mildly anxious denies suicidal homicidal or psychotic thinking.    Emergency Department Course       Imaging:  No orders to display     None    Laboratory:  Labs Ordered and Resulted from Time of ED Arrival to Time of ED Departure - No data to display     Procedures   None    Emergency Department Course & Assessments:             Interventions:  Medications - No data to display     Assessments:  Examined    Independent Interpretation (X-rays, CTs, rhythm strip):  None    Consultations/Discussion of Management or Tests:  None       Social Determinants of Health affecting care:  None in past     Disposition:  [facility]    Impression & Plan    CMS Diagnoses: None   medical Decision Making:  Patient presents to the ED with family he is quite distraught over a constant reyes he is anxious he is depressed.  He does not have any suicidal or homicidal thinking and is not suicidal but he wants help.  He does not appear to be going through withdrawal or intoxicated acutely.  I think [facility] is a good option for him at this time and I have recommended transferring care from the ED.      Diagnosis:    ICD-10-CM    1. Depression with anxiety  F41.8            Discharge Medications:  New Prescriptions    No medications on file          Danyel Manjarrez MD  12/2/2023   Danyel Manjarrez MD Steinman, Randall Ira, MD  12/02/23 1522

## 2023-12-02 NOTE — ED NOTES
Emir is a 38 year old male received from ED due to increasing anxiety and some depression in part due to child custody issues. Reports he has a toddler and his x-girlfriend is the child's mother. They broke up just one year after the birth of their daughter. Since then the patient has been in the court system working out the details of the evolving custody arrangement. Patient denies SI/HI and reports no history of suicide attempts or psychiatric hospitalizations. Mood appears calm and patient is cooperative. Patient only drank a few shots on Monday and last night since staying sober for months. Patient said he uses pot a few times each week before bed to help with sleep.    14 Adderall pills sent to pharmacy.  Bag number = [id number]    Nursing and risk assessments completed. Assessments reviewed with [facility] and physician. Admission information reviewed with patient. Patient given a tour of [facility] and instructions on using the facility. Questions regarding [facility] addressed. Pt safety search completed.

## 2023-12-03 NOTE — CONSULTS
Diagnostic Evaluation Consultation  Crisis Assessment    Patient Name: Emir Brian  Age:  38 year old  Legal Sex: male  Gender Identity: male  Race: White  Ethnicity: Not  or   Language: English      Patient was assessed: In person      Patient location: [facility] EMERGENCY DEPT                             [id number]    Referral Data and Chief Complaint  Emir Brian presents to the ED with family/friends. Patient is presenting to the ED for the following concerns: Depression, Anxiety, Worsening psychosocial stress, Substance use.   Factors that make the mental health crisis life threatening or complex are:  Pt presents to the ED at the recommendation of his family members for increased mental health support. Pt is currently experiencing heightened worsened psychosocial stress as a result of an ongoing custody dispute with the mother of his 3-year-old daughter. There is an upcoming court hearing scheduled for 12/22/23. At present, pt only has visits supervised with his daughter's maternal grandmother. Pt texted his family members over the last week that he was going to take a spontaneous trip to [place]. Pt describes to this writer a feeling of needing to run away and start over. He expresses significant grief about not being able to fully parent his daughter and is tearful throughout the assessment process. He denies any suicidal ideation, reporting that he enjoys life and there is so much beauty in the world. He endorses recurrent panic attacks with upset stomach and difficulty breathing, often precipitating supervised visitation or going to work. He is  sleeping approximately 4 hours per night and eating one meal per day. He tells this writer that he recently relapsed on alcohol after a period of sobriety. He drank 6 shots on Monday (11/27/23) and 9 shots last night. He denies any homicidal ideation..      Informed Consent and Assessment Methods  Explained the crisis assessment  process, including applicable information disclosures and limits to confidentiality, assessed understanding of the process, and obtained consent to proceed with the assessment.  Assessment methods included conducting a formal interview with patient, review of medical records, collaboration with medical staff, and obtaining relevant collateral information from family and community providers when available.  : done     Patient response to interventions: acceptance expressed, eager to participate  Coping skills were attempted to reduce the crisis:  Pt sought out mental healthcare at the hospital today, recognizing his need for increased support.     History of the Crisis   Pt has had limited contact with the mental healthcare system. He is currently prescribed Adderall by his primary care provider for an ADD diagnosis. Pt describes childhood trauma related to his parent's divorce and an ongoing sense of inadequacy. Pt often feels that he is lesser than his siblings.    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[employer].)  Source of Income:  salary/wages  Financial Environmental Concerns:  none  Current Hobbies:  outdoor activities  Barriers in Personal Life:  mental health concerns, emotional concerns    Significant Clinical History  Current Anxiety Symptoms:  panic attack, racing thoughts, excessive worry, shortness of breath or racing heart, anxious  Current Depression/Trauma:  crying or feels like crying, low self esteem, helplessness, sadness  Current Somatic Symptoms:  racing thoughts, excessive worry, shortness of breath or racing heart, anxious  Current Psychosis/Thought Disturbance:     Current Eating Symptoms:   (Pt states that eating approximately one meal per day is normal for him.)  Chemical Use History:  Alcohol:  "Other (comments) (Pt recently relapsed on alcohol with last use last night.)  Last Use::  (12/1/2023)  Benzodiazepines: None  Opiates: None  Cocaine: Other (comments) (Pt reports experimenting with cocaine in his twenties.)  Marijuana: Daily  Other Use: Other (comments) (Pt reports a history of experimenting with psilocybin mushrooms.)   Past diagnosis:  ADHD, Substance Use Disorder  Family history:  Depression, Substance Use Disorder, Other (Pt's mother has a history of OCD, depression, and hoarding. Pt's father has a history of alcoholism.)  Past treatment:  Psychiatric Medication Management, Primary Care  Details of most recent treatment:  Pt is currently under the care of his primary care provider for his ADD.  Other relevant history:  No other relevant history.       Collateral Information  Is there collateral information: Yes     Collateral information name, relationship, phone number:  brother Rosas, PH: [phone]    What happened today: Pt's brother, brother in-law, and father responded to his home today for increasing worries about his mental health. Pt was agreeable to going to the hospital for care. Pt's family found multiple empty bottles of alcohol in his home.     What is different about patient's functioning: Pt is in a high-conflict custody dispute with the mother of his 3-year-old daughter. He was recently awareded visitation with her on Sundays and Mondays. He has another custody hearing scheduled for the 12/22. About 6 days ago, pt texted his family that he spontaneously bought himself a plane ticket to [place] for the day after this trial pending the outcome. Last night, pt called his brother around 12:30 AM making bizarre statements about a plan to save the world. He said that he needed to go the jungle in [place] to \"operate drones and dust crops\". He said that he also needed a meeting with [place] and Maurilio Bhatti to tell them that he can use magnetic gravitational pull and a " "chicadeangelo estrella to go to [place] from the [place]. During this conversation, pt disclosed that he has relapsed on alcohol and taken 5 shots of whiskey. Ralph reports that pt is currently not sleeping at present and is \"up all hours of the night\". He has been texting his boss late into the night with ideas about running their company. He is taking Adderall.     Concern about alcohol/drug use: Pt relapsed on alcohol and has a history of marijuana use.     What do you think the patient needs: Pt needs increased mental health support.     Has patient made comments about wanting to kill themselves/others: no (Pt denied suicidal thoughts to his brother last night.)    If d/c is recommended, can they take part in safety/aftercare planning:  yes    Additional collateral information:  Ralph reports that pt has a long-standing history of mental health concerns and a history of alcoholism. He had a DUI approximately a year ago and was in retirement for a time related to this. There are times that he is in regular contact with his family and times where he stops contact with them for months at a time. He has a history of alcohol, marijuana, and psilocybin mushroom use. Pt's father has a history of alcholism and pt's mother has a history of OCD, depression, and hoarding.     Risk Assessment  Irion Suicide Severity Rating Scale Full Clinical Version: 12/2/23  Suicidal Ideation  Q1 Wish to be Dead (Lifetime): No  Q2 Non-Specific Active Suicidal Thoughts (Lifetime): No  Q6 Suicide Behavior (Lifetime): no     Suicidal Behavior (Lifetime)  Actual Attempt (Lifetime): No  Has subject engaged in non-suicidal self-injurious behavior? (Lifetime): No  Interrupted Attempts (Lifetime): No  Aborted or Self-Interrupted Attempt (Lifetime): No  Preparatory Acts or Behavior (Lifetime): No    Irion Suicide Severity Rating Scale Recent: 12/2/23  Suicidal Ideation (Recent)  Q1 Wished to be Dead (Past Month): no  Q2 Suicidal Thoughts (Past Month): " no  Intensity of Ideation (Recent)  Most Severe Ideation Rating (Past 1 Month):  (0)  Frequency (Past 1 Month):  (0)  Duration (Past 1 Month):  (0)  Controllability (Past 1 Month):  (0)  Deterrents (Past 1 Month): Does not apply  Reasons for Ideation (Past 1 Month): Does not apply  Suicidal Behavior (Recent)  Actual Attempt (Past 3 Months): No  Total Number of Actual Attempts (Past 3 Months): 0  Has subject engaged in non-suicidal self-injurious behavior? (Past 3 Months): No  Interrupted Attempts (Past 3 Months): No  Total Number of Interrupted Attempts (Past 3 Months): 0  Aborted or Self-Interrupted Attempt (Past 3 Months): No  Total Number of Aborted or Self-Interrupted Attempts (Past 3 Months): 0  Preparatory Acts or Behavior (Past 3 Months): No  Total Number of Preparatory Acts (Past 3 Months): 0    Environmental or Psychosocial Events: legal issues such as DWI, DUI, lawsuit, CPS involvement, etc., threats to a prized relationship, challenging interpersonal relationships, helplessness/hopelessness, ongoing abuse of substances  Protective Factors: Protective Factors: strong bond to family unit, community support, or employment, responsibilities and duties to others, including pets and children, help seeking, cultural, spiritual , or Scientologist beliefs associated with meaning and value in life, constructive use of leisure time, enjoyable activities, resilience    Does the patient have thoughts of harming others? Feels Like Hurting Others: no  Previous Attempt to Hurt Others: no  Is the patient engaging in sexually inappropriate behavior?: no    Is the patient engaging in sexually inappropriate behavior?  no        Mental Status Exam   Affect: Blunted  Appearance: Appropriate  Attention Span/Concentration: Attentive  Eye Contact: Engaged    Fund of Knowledge: Appropriate   Language /Speech Content: Fluent  Language /Speech Volume: Normal  Language /Speech Rate/Productions: Normal  Recent Memory: Intact  Remote  Memory: Intact  Mood: Anxious, Sad, Depressed  Orientation to Person: Yes   Orientation to Place: Yes  Orientation to Time of Day: Yes  Orientation to Date: Yes     Situation (Do they understand why they are here?): Yes  Psychomotor Behavior: Normal  Thought Content: Clear  Thought Form: Intact     Medication  Psychotropic medications:     No current facility-administered medications for this encounter.     Current Outpatient Medications   Medication    albuterol (PROAIR HFA/PROVENTIL HFA/VENTOLIN HFA) 108 (90 Base) MCG/ACT inhaler    amphetamine-dextroamphetamine (ADDERALL) 10 MG tablet    amphetamine-dextroamphetamine (ADDERALL) 30 MG tablet    ibuprofen (ADVIL/MOTRIN) 800 MG tablet    levocetirizine (XYZAL) 5 MG tablet    multivitamin w/minerals (MULTI-VITAMIN) tablet      Current Care Team  Patient Care Team:  Andrea Green as PCP - General (Family Practice)    Diagnosis  Patient Active Problem List   Diagnosis Code    Depression F32.A    Anxiety F41.9       Primary Problem This Admission  Active Hospital Problems    Depression      Anxiety        Clinical Summary and Substantiation of Recommendations   It is the recommendation of this writer that pt discharge from the ED to follow-up with outpatient supports, including individual therapy and medication management. Pt denies any suicidal ideation, reporting that he enjoys life and that there is so much beauty in the world. He is future-oriented and motivated to engage in outpatient treatment.    Patient coping skills attempted to reduce the crisis:  Pt sought out mental healthcare at the hospital today, recognizing his need for increased support.    Disposition  Recommended disposition: Individual Therapy, Medication Management        Reviewed case and recommendations with attending provider. Attending Name: Geo Mckenzie       Attending concurs with disposition: yes       Patient and/or validated legal guardian concurs with disposition:   yes       Final  disposition:  discharge    Legal status on admission: Voluntary/Patient has signed consent for treatment    Assessment Details   Total duration spent with the patient: 40 min     CPT code(s) utilized: 08382 - Psychotherapy for Crisis - 60 (30-74*) min    DANNY Buchanan, Psychotherapist  [facility] - Triage & Transition Services  Callback: [phone]

## 2023-12-03 NOTE — DISCHARGE INSTRUCTIONS
Aftercare Plan    Follow up with established providers and supports as scheduled. Continue taking medications as prescribed. Abstain from drugs and alcohol. Utilize your [facility] mental [facility] crisis team as needed. They are available 24/7. Contact information is listed below.     The following appointment(s) and/or referral(s) were made on your behalf. If you need to make changes or cancel please contact the clinic/provider directly.     Individual Therapy    Date: Monday, 12/11/2023 10:00 am   Provider: Dontae Silva PsyD    Location: [address], [address]   Phone: [phone]   Type: Therapy - Initial (In-Person)    Patient Instructions:  For patients being seen in office, please arrive 15 minutes early to check-in and complete intake paperwork. For patients being seen via telehealth, the provider will email a link to the patient to connect to the video session. Intake paperwork will be emailed prior to the appointment.    Medication Management    Date: Monday, 12/18/2023  Time: 12:00 pm - 1:00 pm  Provider: Beth Parson DNP  CNP,PMHNP,RN  Location: [place], [address] [place] [address], [address] [id number]  Phone: [phone]  Type: Medication Mgmt - Initial (In-Person)    Patient Instructions  Please bring insurance cards, photo ID, and list of medications with dosages.     Remember: give the referrals 3 sessions prior to calling it quits. Do you trust them? Do you feel understood? Do you think they can help? Check in with yourself after each session    If I am feeling unsafe or I am in a crisis, I will:   Contact my established care providers   Call the National Suicide Prevention Lifeline: 296.253.9977   Go to the nearest emergency room   Call 911     Warning signs that I or other people might notice when a crisis is developing for me: changes to sleep, appetite or mood, increased anger, agitation or irritability,  feeling depressed or hopeless, spending more time alone or talking less, increased crying, decreased productivity, seeing or hearing things that aren't there, thoughts of not wanting to live anymore or of actually killing myself, thoughts of hurting others    Things I am able to do on my own to cope or help me feel better: watching a favorite tv show or movie, listening to music I enjoy, going outside and breathing fresh air, going for a walk or exercising, taking a shower or bath, a cold or hot beverage, a healthy snack, drawing/coloring/painting, journaling, singing or dancing, deep breathing     I can try practicing square breathing when I begin to feel anxious - inhale through the nose for the count of 4 and the first line on the square. Exhale through the mouth for the count of 4 for the second line of the square. Repeat to complete the square. Repeat the square as many times as needed.    I can also use my five senses to practice mindfulness and grounding. What are five things I can see, four things I can hear, three things I can feel, two things I can smell, and one thing I can taste.     Things that I am able to do with others to cope or help me feel better: sometimes just talking or spending time with someone else, sharing a meal or having coffee, watching a movie or playing a game, going for a walk or exercising    I can also use community resources including mental health hotlines, [facility] crisis teams, or apps.     Things I can use or do for distraction: movies/tv, music, reading, games, drawing/coloring/painting or other art, essential oils, exercise, cleaning/organizing, puzzles, crossword puzzles, word search, Sudoku       I can also download a meditation or relaxation malena, like Calm, Headspace, or Insight Timer (all three offer a free version)    Changes I can make to support my mental health and wellness: Attend scheduled mental health therapy and psychiatric appointments. Take my medications as  "prescribed. Maintain a daily schedule/routine. Abstain from all mood altering substances, including drugs, alcohol, or medications not currently prescribed to me. Implement a self-care routine.      People in my life that I can ask for help: friends or family, trusted teachers/staff/colleagues, trusted members of my community or place of Oriental orthodox, mental health crisis lines, or 911    Your [facility] has a mental health crisis team you can call 24/7: [facility], [phone]    Other things that are important when I m in crisis: to remember that the feelings I am having right now are temporary, and it won't feel like this forever, and that it is okay and important to ask for help    Crisis Lines  Crisis Text Line  Text 563113  You will be connected with a trained live crisis counselor to provide support.    Por essie, texto  FAM a 576638 o texto a 442-AYUDAME en WhatsApp    National Hope Line  1.800.SUICIDE [1925256]      Community Resources  Fast Tracker  Linking people to mental health and substance use disorder resources  fasttrackHuman Longevityn.[place]     [place] Mental Health Warm Line  Peer to peer support  Monday thru Saturday, 12 pm to 10 pm  891.623.8850 or 0.424.147.7999  Text \"Support\" to 14143    National [facility] (CHITO)  648.471.9096 or 1.888.CHITO.HELPS      Mental Health Apps  My3  https://my[facility] Medicalpp.org/    VirtualHopeBox  https://[facility].org/apps/virtual-hope-box/      Additional Information  Today you were seen by a licensed mental health professional through Triage and Transition services, [facility] ([facility])  for a crisis assessment in the Emergency Department at [facility].  It is recommended that you follow up with your established providers (psychiatrist, mental health therapist, and/or primary care doctor - as relevant) as soon as possible. Coordinators from P will be calling you in the next 24-48 hours to ensure that you have the resources you " need.  You can also contact [facility] coordinators directly at [phone]. You may have been scheduled for or offered an appointment with a mental health provider. [facility] maintains an extensive network of licensed behavioral health providers to connect patients with the services they need.  We do not charge providers a fee to participate in our referral network.  We match patients with providers based on a patient's specific needs, insurance coverage, and location.  Our first effort will be to refer you to a provider within your care system, and will utilize providers outside your care system as needed.

## 2023-12-03 NOTE — ED PROVIDER NOTES
[facility] Unit - Psychiatric Consultation  [facility] Emergency Department    Emir Brian MRN: [id number]   Age: 38 year old YOB: 1985     History     Chief Complaint   Patient presents with    Mental Health Problem     HPI  Emir Brian is a 38 year old male with history notable for alcohol abuse, ADHD.  Patient presented to the emergency department for evaluation of worsening mood and anxiety symptoms in the setting of psychosocial stressors and recent relapse on alcohol after 11 months of sobriety. Patient was medically evaluated in the emergency department and determined to be medically stable for transfer to [facility] for further psychiatric assessment.  Patient is nearing 5 hours in the emergency department.    Here at [facility], patient discusses that he has been in a custody reyes over his 4-year-old daughter, leading to feelings of anxiety and frustration. He notes that he is only able to see his daughter during supervised visits, which did not happen frequently.  There is an upcoming court hearing later this month, contributing to his stress.  He expresses feelings of grief about not being able to be involved in his daughter's life. At night, he often thinks about his daughter and the events surrounding her custody.  He endorses experiencing episodes of panic, which feel like a jolt of adrenaline, chest tightness, difficulty breathing, and upset stomach which tend to correlate with supervised visits with daughter.  Patient is not sure if he feels depressed, as he still very much enjoys socializing with others, and finds jennifer in his interests.  He is feeling quite stuck and overwhelmed at times.  He tends to have difficulty turning his mind off at night.  Some nights, he is unable to fall asleep due to various thoughts floating through his mind.  He reports this past week, he relapsed on alcohol after 11 months of sobriety.  On Monday, he drank around 6 shots and last night drank around 9 shots.  He  "denies any symptoms of withdrawal.  He reports he was able to maintain sobriety on his own and plans to abstain from alcohol in the future.  He declines any chemical health resources, but is agreeable to outpatient individual therapy as well as psychiatry follow-up.  He is also interested in a medication to help reduce his symptoms of anxiety and stress and we discussed mirtazapine as an option which may also help him sleep at night.      Past Medical History  Past Medical History:   Diagnosis Date    ADD (attention deficit disorder)      No past surgical history on file.  albuterol (PROAIR HFA/PROVENTIL HFA/VENTOLIN HFA) 108 (90 Base) MCG/ACT inhaler  amphetamine-dextroamphetamine (ADDERALL) 10 MG tablet  amphetamine-dextroamphetamine (ADDERALL) 30 MG tablet  ibuprofen (ADVIL/MOTRIN) 800 MG tablet  levocetirizine (XYZAL) 5 MG tablet  mirtazapine (REMERON) 15 MG tablet  multivitamin w/minerals (MULTI-VITAMIN) tablet      Allergies   Allergen Reactions    Pcn [Penicillins]      hives     Family History  No family history on file.  Social History   Social History     Tobacco Use    Smoking status: Former    Smokeless tobacco: Never    Tobacco comments:     2006   Substance Use Topics    Alcohol use: Not Currently    Drug use: Not Currently      Past medical history, past surgical history, medications, allergies, family history, and social history were reviewed with the patient. No additional pertinent items.       Review of Systems  A medically appropriate review of systems was performed with pertinent positives and negatives noted in the HPI, and all other systems negative.    Physical Examination   BP: 138/85  Pulse: 106  Temp: 98.1  F (36.7  C)  Resp: 16  Height: 180.3 cm (5' 11\")  Weight: 81.6 kg (180 lb)  SpO2: 98 %    Physical Exam  General: Appears stated age.   Neuro: Alert and fully oriented. Extremities appear to demonstrate normal strength on visual inspection.   Integumentary/Skin: no rash visualized, " normal color    Psychiatric Examination   Appearance: awake, alert, adequately groomed, appeared as age stated, and casually dressed  Attitude:  cooperative  Eye Contact:  fair  Mood:  anxious  Affect:  mood congruent and intensity is normal  Speech:  clear, coherent and normal prosody  Psychomotor Behavior:  no evidence of tardive dyskinesia, dystonia, or tics  Thought Process:  linear  Associations:  no loose associations  Thought Content:  no evidence of suicidal ideation or homicidal ideation and no evidence of psychotic thought  Insight:  good  Judgement:  intact  Oriented to:  time, person, and place  Attention Span and Concentration:  fair  Recent and Remote Memory:  intact  Language: able to name/identify objects without impairment  Fund of Knowledge: intact with awareness of current and past events    ED Course        Labs Ordered and Resulted from Time of ED Arrival to Time of ED Departure - No data to display    Assessments & Plan (with Medical Decision Making)   Patient presenting with symptoms of anxiety and a depressed mood in the setting of psychosocial stressors and recent relapse on alcohol. Nursing notes reviewed noting no acute issues.     I have reviewed the assessment completed by the [facility].     Preliminary diagnosis:    ICD-10-CM    1. Alcohol use disorder  F10.90       2. SOLEDAD (generalized anxiety disorder)  F41.1       3. Depressive disorder  F32.A       4. Attention deficit hyperactivity disorder (ADHD), predominantly inattentive type  F90.0            Treatment Plan:  - Trial mirtazapine 15 mg at bedtime for sleep, mood, anxiety symptoms. Discussed risk for increased appetite and morning grogginess for the first couple of days.   - Continue PTA Adderall for treatment of ADHD  - Pt will be provided with follow-up appointments for outpatient individual psychotherapy as well as psychiatric medication management. He declines any chemical health resources.   - Patient to be discharged home this  evening      After a period of working with the treatment team on the [facility] unit, the patient's mental state improved to allow a safe transition to outpatient care. After counseling on the diagnosis, work-up, and treatment plan, the patient was discharged. Close follow-up with a psychiatrist and/or therapist was recommended and community psychiatric resources were provided. Patient is to return to the ED if any urgent or potentially life-threatening concerns.     At the time of discharge, the patient's acute suicide risk was determined to be low due to the following factors: Reduction in the intensity of mood/anxiety symptoms that preceded the admission, denial of suicidal thoughts, denies feeling helpless or helpless, not currently under the influence of alcohol or illicit substances, denies experiencing command hallucinations, no immediate access to firearms. The patient's acute risk could be higher if noncompliant with their treatment plan, medications, follow-up appointments or using illicit substances or alcohol. Protective factors include: social supports, children, stable housing, employment    --  Seth Mckenzie CNP   [facility] EMERGENCY DEPT  [facility] Unit      Seth Mckenzie CNP  12/02/23 1922

## 2023-12-03 NOTE — PROGRESS NOTES
Triage & Transition Services, Extended Care     Client Name: Emir Brian    Date: December 2, 2023    Patient was seen    Mode of Assessment: in-person    Service Type: (P) other (see comments) (pt preparing to discharge)  Session Start Time:  (P) 1950    Session End Time: (P) 2010  Session Length: (P) 20  Site Location: [facility] EMERGENCY DEPT                             [id number]  Total Number ofAttendees: (P) 1  Topic:   (P) coping skills/lifestyle management   Response: (P) other (see comments) (pt was preparing to discharge)     Ingrid Burrell [facility]   Licensed Mental Health Professional (LMHP), Extended Care  [phone]

## 2023-12-03 NOTE — ED NOTES
Patient agreed to discharge plan. Discharge instructions reviewed with patient including follow-up care plan. New medication, Remeron, sent to [facility]'s 24 hour pharmacy on [address]. Reviewed safety plan and outpatient resources. Denied SI and HI. All belongings that were brought into the hospital have been returned to patient including cell phone, keys and vape pen. Patient escorted off the unit at 2030 accompanied by [facility] staff. Discharged to home via ride with his father. Mood appeared stable with a content outlook.

## 2024-12-21 ENCOUNTER — HEALTH MAINTENANCE LETTER (OUTPATIENT)
Age: 39
End: 2024-12-21

## (undated) RX ORDER — HYDROCODONE BITARTRATE AND ACETAMINOPHEN 5; 325 MG/1; MG/1
TABLET ORAL
Status: DISPENSED
Start: 2017-06-01